# Patient Record
(demographics unavailable — no encounter records)

---

## 2025-02-12 NOTE — PROCEDURE
[Cervical Pap Smear] : cervical Pap smear [GC & Chlamydia via Pap] : GC & Chlamydia via Pap [Affirm (Triple Culture)] : Affirm (triple culture)

## 2025-02-12 NOTE — HISTORY OF PRESENT ILLNESS
[Y] : Patient is sexually active [Normal Amount/Duration] :  normal amount and duration [Regular Cycle Intervals] : periods have been regular [No] : Patient does not have concerns regarding sex [Currently Active] : currently active [FreeTextEntry1] : 41 yo  presents for c/o of vaginal itchiness post antibiotics and an annual [PGHxTotal] : 2 [facility] : [facility] : [facility]

## 2025-02-12 NOTE — COUNSELING
[Nutrition/ Exercise/ Weight Management] : nutrition, exercise, weight management [Breast Self Exam] : breast self exam [Bladder Hygiene] : bladder hygiene [Contraception/ Emergency Contraception/ Safe Sexual Practices] : contraception, emergency contraception, safe sexual practices [Preconception Care/ Fertility options] : preconception care, fertility options

## 2025-02-12 NOTE — PHYSICAL EXAM
[Chaperone Declined] : Patient declined chaperone [Appropriately responsive] : appropriately responsive [Alert] : alert [No Acute Distress] : no acute distress [No Lymphadenopathy] : no lymphadenopathy [Soft] : soft [Non-tender] : non-tender [Non-distended] : non-distended [No HSM] : No HSM [No Lesions] : no lesions [No Mass] : no mass [Oriented x3] : oriented x3 [Examination Of The Breasts] : a normal appearance [No Discharge] : no discharge [No Masses] : no breast masses were palpable [Vulvitis] : vulvitis [Labia Majora] : normal [Labia Minora] : normal [Discharge] : a  ~M vaginal discharge was present [Moderate] : moderate [Roderick] : yellow [No Bleeding] : There was no active vaginal bleeding [Normal] : normal [Uterine Adnexae] : normal

## 2025-03-26 NOTE — HISTORY OF PRESENT ILLNESS
[FreeTextEntry1] : 39 yo presents for evaluation of Bacterial vaginosis.  Patient reports taking RX.  Patient reports Vaginal prolapse with urinary stress incontinence.  She states is in the waiting list for Pelvic floor therapy.  Patient declined urogyn consult.  She plans to have another pregnancy. Pelvic US WNL.

## 2025-03-26 NOTE — REASON FOR VISIT
[Follow-Up] : a follow-up evaluation of [Urinary Complaints] : urinary complaints [Incontinence] : incontinence

## 2025-03-26 NOTE — HISTORY OF PRESENT ILLNESS
[FreeTextEntry1] : 39 yo presents for evaluation of Bacterial vaginosis.  Patient reports taking RX.  Patient reports Vaginal prolapse with urinary stress incontinence.  She states is in the waiting list for Pelvic floor therapy.  Patient declined urogyn consult.  She plans to have another pregnancy. Pelvic US WNL. normal...

## 2025-03-26 NOTE — PHYSICAL EXAM
[Chaperone Declined] : Patient declined chaperone [Soft] : soft [Non-tender] : non-tender [Non-distended] : non-distended [Oriented x3] : oriented x3 [Labia Majora] : normal [Labia Minora] : normal [Cystocele] : a cystocele [No Bleeding] : There was no active vaginal bleeding [Normal] : normal [Uterine Adnexae] : normal

## 2025-03-26 NOTE — COUNSELING
[Vitamins/Supplements] : vitamins/supplements [Nutrition/ Exercise/ Weight Management] : nutrition, exercise, weight management [Breast Self Exam] : breast self exam [Bladder Hygiene] : bladder hygiene [Preconception Care/ Fertility options] : preconception care, fertility options [Lab Results] : lab results [Medication Management] : medication management

## 2025-03-26 NOTE — HISTORY OF PRESENT ILLNESS
[FreeTextEntry1] : 41 yo presents for evaluation of Bacterial vaginosis.  Patient reports taking RX.  Patient reports Vaginal prolapse with urinary stress incontinence.  She states is in the waiting list for Pelvic floor therapy.  Patient declined urogyn consult.  She plans to have another pregnancy. Pelvic US WNL.

## 2025-06-14 NOTE — HISTORY OF PRESENT ILLNESS
[FreeTextEntry1] : 39 yo presents for recurrent vulvovaginitis.  Pt reports using prophylactic OTC supplements.

## 2025-06-14 NOTE — PHYSICAL EXAM
[Chaperone Declined] : Chaperone offered however refused by patient, [Appropriately responsive] : appropriately responsive [Alert] : alert [No Acute Distress] : no acute distress [Soft] : soft [Non-tender] : non-tender [Non-distended] : non-distended [Oriented x3] : oriented x3 [Labia Majora] : normal [Labia Minora] : normal [Discharge] : a  ~M vaginal discharge was present [Scant] : scant [White] : white [Normal] : normal [Uterine Adnexae] : normal

## 2025-06-14 NOTE — HISTORY OF PRESENT ILLNESS
[FreeTextEntry1] : 41 yo presents for recurrent vulvovaginitis.  Pt reports using prophylactic OTC supplements.